# Patient Record
Sex: FEMALE | Race: WHITE | Employment: UNEMPLOYED | URBAN - METROPOLITAN AREA
[De-identification: names, ages, dates, MRNs, and addresses within clinical notes are randomized per-mention and may not be internally consistent; named-entity substitution may affect disease eponyms.]

---

## 2022-06-12 ENCOUNTER — HOSPITAL ENCOUNTER (EMERGENCY)
Age: 42
Discharge: HOME | End: 2022-06-12
Attending: STUDENT IN AN ORGANIZED HEALTH CARE EDUCATION/TRAINING PROGRAM
Payer: COMMERCIAL

## 2022-06-12 ENCOUNTER — APPOINTMENT (OUTPATIENT)
Dept: GENERAL RADIOLOGY | Age: 42
End: 2022-06-12
Payer: COMMERCIAL

## 2022-06-12 VITALS
HEIGHT: 65 IN | HEART RATE: 88 BPM | BODY MASS INDEX: 23.32 KG/M2 | TEMPERATURE: 97.8 F | DIASTOLIC BLOOD PRESSURE: 92 MMHG | RESPIRATION RATE: 18 BRPM | SYSTOLIC BLOOD PRESSURE: 137 MMHG | WEIGHT: 140 LBS | OXYGEN SATURATION: 100 %

## 2022-06-12 DIAGNOSIS — S93.401A SPRAIN OF RIGHT ANKLE, UNSPECIFIED LIGAMENT, INITIAL ENCOUNTER: Primary | ICD-10-CM

## 2022-06-12 PROCEDURE — 73610 X-RAY EXAM OF ANKLE: CPT

## 2022-06-12 PROCEDURE — 99283 EMERGENCY DEPT VISIT LOW MDM: CPT

## 2022-06-12 ASSESSMENT — PAIN DESCRIPTION - ORIENTATION: ORIENTATION: RIGHT

## 2022-06-12 ASSESSMENT — PAIN DESCRIPTION - FREQUENCY: FREQUENCY: CONTINUOUS

## 2022-06-12 ASSESSMENT — PAIN DESCRIPTION - DESCRIPTORS: DESCRIPTORS: DISCOMFORT

## 2022-06-12 ASSESSMENT — PAIN - FUNCTIONAL ASSESSMENT: PAIN_FUNCTIONAL_ASSESSMENT: 0-10

## 2022-06-12 ASSESSMENT — PAIN DESCRIPTION - PAIN TYPE: TYPE: ACUTE PAIN

## 2022-06-12 ASSESSMENT — PAIN DESCRIPTION - LOCATION: LOCATION: ANKLE

## 2022-06-12 ASSESSMENT — PAIN SCALES - GENERAL: PAINLEVEL_OUTOF10: 7

## 2022-06-12 ASSESSMENT — ENCOUNTER SYMPTOMS: COLOR CHANGE: 0

## 2022-06-12 NOTE — ED TRIAGE NOTES
Pt states she fell in a hole while playing volleyball and rolled ankle. Pt states swelling and pain to R ankle.

## 2022-06-12 NOTE — ED PROVIDER NOTES
[address] ED  Emergency Department Encounter  EmergencyMedicine Resident     Pt Bishop Dino Christy  MRN: [id number]  [place] 1980  Date of evaluation: 6/12/22  PCP:  No primary care provider on file. CHIEF COMPLAINT       Chief Complaint   Patient presents with    Ankle Injury       HISTORY OF PRESENT ILLNESS  (Location/Symptom, Timing/Onset, Context/Setting, Quality, Duration, Modifying Factors, Severity.)      Edison Rico is a 40 y.o. female who presents with right ankle injury after playing volleyball and came down into a hole in the ground that caused the ankle to roll inwards. Patient denies any medical problems, no allergies to medications. Patient states she has difficulty putting weight on the foot. Rates the pain 7 out of 10, requests no pain medication at this time. No tenderness over the fibular head or concerns for [place]uve fracture. PAST MEDICAL / SURGICAL / SOCIAL / FAMILY HISTORY      has no past medical history on file. No additional pertinent     has no past surgical history on file. No additional pertinent    Social History     Socioeconomic History    Marital status:      Spouse name: Not on file    Number of children: Not on file    Years of education: Not on file    Highest education level: Not on file   Occupational History    Not on file   Tobacco Use    Smoking status: Never Smoker    Smokeless tobacco: Never Used   Substance and Sexual Activity    Alcohol use: Yes     Comment: social    Drug use: Never    Sexual activity: Not on file   Other Topics Concern    Not on file   Social History Narrative    Not on file     Social Determinants of Health     Financial Resource Strain:     Difficulty of Paying Living Expenses: Not on file   Food Insecurity:     Worried About Running Out of Food in the Last Year: Not on file    Scott of Food in the Last Year: Not on file   Transportation Needs:     Lack of Transportation (Medical):  Not on file    Lack of Transportation (Non-Medical): Not on file   Physical Activity:     Days of Exercise per Week: Not on file    Minutes of Exercise per Session: Not on file   Stress:     Feeling of Stress : Not on file   Social Connections:     Frequency of Communication with Friends and Family: Not on file    Frequency of Social Gatherings with Friends and Family: Not on file    Attends Jew Services: Not on file    Active Member of [address] [place] [address] or Organizations: Not on file    Attends Club or Organization Meetings: Not on file    Marital Status: Not on file   Intimate Partner Violence:     Fear of Current or Ex-Partner: Not on file    Emotionally Abused: Not on file    Physically Abused: Not on file    Sexually Abused: Not on file   Housing Stability:     Unable to Pay for Housing in the Last Year: Not on file    Number of [place] in the Last Year: Not on file    Unstable Housing in the Last Year: Not on file       History reviewed. No pertinent family history. Allergies:  Patient has no known allergies. Home Medications:  Prior to Admission medications    Not on File       REVIEW OF SYSTEMS    (2-9 systems for level 4, 10 or more for level 5)      Review of Systems   Musculoskeletal: Positive for arthralgias (Swelling over the right ankle), gait problem (Difficulty ambulating on the right foot difficulty weightbearing on the right foot) and joint swelling (Right ankle swelling). Skin: Negative for color change. PHYSICAL EXAM   (up to 7 for level 4, 8 or more for level 5)      INITIAL VITALS:   BP (!) 137/92   Pulse 88   Temp 97.8 °F (36.6 °C) (Temporal)   Resp 18   Ht 5' 5\" (1.651 m)   Wt 140 lb (63.5 kg)   LMP 06/12/2022   SpO2 100%   BMI 23.30 kg/m²     Physical Exam  Cardiovascular:      Pulses: Normal pulses.       Comments: 2+ pulses of the right foot  Musculoskeletal:         General: Swelling (Right ankle), tenderness (Palpation over the anterior tibiofibular ligament) and signs of injury present. Skin:     General: Skin is warm. Capillary Refill: Capillary refill takes 2 to 3 seconds. Neurological:      General: No focal deficit present. Mental Status: She is oriented to person, place, and time. Comments: Motor, sensation, pulses intact in the right foot distal to the injury   Psychiatric:         Mood and Affect: Mood normal.         Behavior: Behavior normal.         DIFFERENTIAL  DIAGNOSIS     PLAN (LABS / IMAGING / EKG):  Orders Placed This Encounter   Procedures    XR ANKLE RIGHT (MIN 3 VIEWS)    [employer] Ankle Brace, Right    [employer] Crutches; Pair, Right Side Injury; Med (5'2\"-5'10\")       MEDICATIONS ORDERED:  No orders of the defined types were placed in this encounter. DIAGNOSTIC RESULTS / EMERGENCY DEPARTMENT COURSE / MDM   LAB RESULTS:  No results found for this visit on 06/12/22. RADIOLOGY:  XR ANKLE RIGHT (MIN 3 VIEWS)   Final Result   No acute bony abnormalities are noted                  PROCEDURES:  None    CONSULTS:  None    MEDICAL DECISION MAKING:  Patient presenting with inversion injury to the right foot. Concern for sprain versus fracture. X-ray obtained which demonstrated no fracture noted. Patient was provided Ace wrap, Aircast, and crutches. Patient instructed to follow-up with PCP or orthopedics. Patient given occult fracture precautions. Patient instructed to use rice therapy. Patient instructed to be evaluated for any worsening pain, any further concerns. Patient had good motor sensation and pulses intact. Patient requested no pain medication and was instructed to utilize ibuprofen and Tylenol over-the-counter. Patient discharged home in stable condition and agreeable with plan. CRITICAL CARE:  Please see attending note    FINAL IMPRESSION      1.  Sprain of right ankle, unspecified ligament, initial encounter          DISPOSITION / [address] Decision To Discharge 06/12/2022 07:09:53 PM      PATIENT REFERRED TO:  Marliin Doan MD  [address] [place] [address] [phone]  [phone]    Schedule an appointment as soon as possible for a visit   Follow-up with his orthopedic surgeon in the area here for reevaluation of the ankle if its not improving. Primary care doctor  Please follow-up with general primary care doctor where it may be for urgent care or emergency department if the pain is not improving. DISCHARGE MEDICATIONS:  There are no discharge medications for this patient.       John Almeida DO  Emergency Medicine Resident    (Please note that portions of thisnote were completed with a voice recognition program.  Efforts were made to edit the dictations but occasionally words are mis-transcribed.)       Aleisha Real DO  Resident  06/12/22 0837

## 2022-06-12 NOTE — ED NOTES
Ace wrap and ankle brace applied to right ankle. PMS intact.  Crutches given     Sindhu Arenas RN  06/12/22 1955

## 2022-06-13 NOTE — ED PROVIDER NOTES
[address]     Pt Name: Elizabeth Zarate  MRN: [id number]  [place] 1980  Date of evaluation: 6/12/22       Elizabeth Zarate is a 40 y.o. female who presents with Ankle Injury      MDM:   Right ankle pain  Playing volleyball, fell in a hole    Imaging, reassessment, likely discharge    Vitals:   Vitals:    06/12/22 1805   BP: (!) 137/92   Pulse: 88   Resp: 18   Temp: 97.8 °F (36.6 °C)   TempSrc: Temporal   SpO2: 100%   Weight: 140 lb (63.5 kg)   Height: 5' 5\" (1.651 m)       PHYSICAL:   Temp: 97.8 °F (36.6 °C),  Heart Rate: 88, Resp: 18, BP: (!) 137/92, SpO2: 100 %  Gen: Non-toxic, Afebrile  Neck: Supple  Cards: Regular rate and rhythm  Pulm: Lung sounds clear to auscultation  Abdomen: Soft, non-tender, non-distended  Skin: warm, dry  Extremities: pulses 2+ radial / dorsalis pedis, no clubbing, cyanosis, edema. Swelling over the right ankle, outpatient over the anterior ankle results and tenderness. DP pulse, PT pulse 2+. Distal sensation intact. Neurologic: CAOX3, no facial asymmetry, no dysarthria or aphasia       I personally saw and examined the patient. I have reviewed and agree with the resident's findings, including all diagnostic interpretations and treatment plan as written. I was present for the key portions of any procedures performed and the inclusive time noted for any critical care statement. There was a high probability of clinically significant/life threatening deterioration in this patient's condition which required my urgent intervention. Total critical care time was 5 minutes. This excludes any time for separately reportable procedures. The care is provided during an unprecedented national emergency due to the novel coronavirus, COVID 19.   Deloris uW DO  Attending Emergency Physician         Deloris Wu DO  06/13/22 1039